# Patient Record
(demographics unavailable — no encounter records)

---

## 2024-10-30 NOTE — REVIEW OF SYSTEMS
[Blurred Vision] : blurred vision [Shortness Of Breath] : shortness of breath [Nocturia] : nocturia [Headaches] : headaches [Polydipsia] : polydipsia [Negative] : Heme/Lymph [FreeTextEntry3] : sees Ophtalmology  [FreeTextEntry6] : wakes up at night and drinks water then feels better, per , she snores  [de-identified] : chronic

## 2024-10-30 NOTE — REVIEW OF SYSTEMS
[Blurred Vision] : blurred vision [Shortness Of Breath] : shortness of breath [Nocturia] : nocturia [Headaches] : headaches [Polydipsia] : polydipsia [Negative] : Heme/Lymph [FreeTextEntry3] : sees Ophtalmology  [FreeTextEntry6] : wakes up at night and drinks water then feels better, per , she snores  [de-identified] : chronic

## 2024-10-30 NOTE — ASSESSMENT
[Little interest or pleasure doing things] : 1) Little interest or pleasure doing things [Feeling down, depressed, or hopeless] : 2) Feeling down, depressed, or hopeless [0] : 2) Feeling down, depressed, or hopeless: Not at all (0) [PHQ-2 Negative - No further assessment needed] : PHQ-2 Negative - No further assessment needed [[id number]] : 0

## 2024-10-30 NOTE — HISTORY OF PRESENT ILLNESS
[FreeTextEntry1] : Ms. ZAIN CUENCA is a 44 year old female sent in a consult by her PCP from [facility] for hypothyroidism , difficult to control she found out through her labwork that her thyroid was low 11/2022 when TSH was 84 when she started Levothyroxine 25mcg qd   stopped Biotin 12/2023 per pcp Dr Judith Franco  who now left [facility] , has  anew PCP thaqt she has not seen yet   dose Levothyroxine from 25 to 50 2/2023 per pt, forgotten only one day in the last two week  takes Vit D and MVI after dinner , takes her Levothyroxine correctly   10/29/24 Patient is doing okay. 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 Saw neurologist today.

## 2024-10-30 NOTE — ASSESSMENT
[Little interest or pleasure doing things] : 1) Little interest or pleasure doing things [Feeling down, depressed, or hopeless] : 2) Feeling down, depressed, or hopeless [0] : 2) Feeling down, depressed, or hopeless: Not at all (0) [PHQ-2 Negative - No further assessment needed] : PHQ-2 Negative - No further assessment needed [[id number]] : 0

## 2024-10-30 NOTE — END OF VISIT
[Time Spent: ___ minutes] : I have spent [unfilled] minutes of time on the encounter which excludes teaching and separately reported services. [FreeTextEntry3] :  I, Claudy Ramey, am scribing for and in the presence of Dr. Celina Sullivan in the following sections: HISTORY OF PRESENT ILLNESS; REVIEW OF SYSTEMS; PHYSICAL EXAM; ASSESSMENT/ PLAN.I, Celina Sullivan, personally performed the services described in the documentation, reviewed the documentation recorded by the scribe in my presence, and it accurately and completely records my words and actions. 10/29/24

## 2025-05-15 NOTE — ASSESSMENT
[FreeTextEntry1] : Thyroid Function: - Assessment : Patient's thyroid levels are within normal range based on recent blood work. Patient is currently taking levothyroxine in the morning, followed by famotidine 30 minutes later. - Plan : - Continue current levothyroxine dosage  - Discuss with gastroenterologist about potentially taking famotidine at bedtime to improve levothyroxine absorption  - If unable to change famotidine timing, monitor for symptoms of hypothyroidism  - Follow up in 6 months with Dr. Sullivan or sooner if symptoms develop  - Option for early thyroid function test if fatigue or other symptoms occur  General Health: - Assessment : Blood work shows normal cholesterol, blood sugar, and vitamin D levels. Blood pressure and physical examination were unremarkable. - Plan : - Provided patient with printed copy of blood test results  - Encouraged to share results with other healthcare providers  - Offered option for virtual follow-up visits if needed  - Advised to call if any new symptoms or concerns arise

## 2025-05-15 NOTE — HISTORY OF PRESENT ILLNESS
[FreeTextEntry1] : Ms. ZAIN CUENCA is a 44 year old female sent in a consult by her PCP from [facility] for hypothyroidism , difficult to control she found out through her labwork that her thyroid was low 11/2022 when TSH was 84 when she started Levothyroxine 25mcg qd stopped Biotin 12/2023 per pcp Dr Judith Franco who now left [facility] , has anew PCP thaqt she has not seen yet  dose Levothyroxine from 25 to 50 2/2023 per pt, forgotten only one day in the last two week  takes Vit D and MVI after dinner , takes her Levothyroxine correctly  10/29/24 Patient is doing okay. 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 Saw neurologist today.  5/15/25- Started taking famotidine 1/2 hr before thyroid medicine.  [facility] prescribed.  Had migraines possible gastritis in the past she believes due to migraine medication which she stopped.  Denies symptoms of hypothyroidism except constipation which she takes an occasional laxative.

## 2025-05-15 NOTE — PHYSICAL EXAM
[Alert] : alert [Well Nourished] : well nourished [Healthy Appearance] : healthy appearance [No Acute Distress] : no acute distress [Well Developed] : well developed [Normal Voice/Communication] : normal voice communication [Normal Sclera/Conjunctiva] : normal sclera/conjunctiva [No Proptosis] : no proptosis [No Neck Mass] : no neck mass was observed [Supple] : the neck was supple [Thyroid Not Enlarged] : the thyroid was not enlarged [No Thyroid Nodules] : no palpable thyroid nodules [No Respiratory Distress] : no respiratory distress [Clear to Auscultation] : lungs were clear to auscultation bilaterally [Normal S1, S2] : normal S1 and S2 [Normal Rate] : heart rate was normal [Regular Rhythm] : with a regular rhythm [No Edema] : no peripheral edema [No Stigmata of Cushings Syndrome] : no stigmata of Cushings Syndrome [Normal Gait] : normal gait [No Tremors] : no tremors [Oriented x3] : oriented to person, place, and time [Normal Affect] : the affect was normal [Recent Memory Normal] : recent memory was not impaired [Normal Insight/Judgement] : insight and judgment were intact [Normal Mood] : the mood was normal [Remote Memory Normal] : remote memory was not impaired